# Patient Record
Sex: MALE | Race: WHITE
[De-identification: names, ages, dates, MRNs, and addresses within clinical notes are randomized per-mention and may not be internally consistent; named-entity substitution may affect disease eponyms.]

---

## 2017-07-15 ENCOUNTER — HOSPITAL ENCOUNTER (EMERGENCY)
Dept: HOSPITAL 47 - EC | Age: 37
Discharge: HOME | End: 2017-07-15
Payer: COMMERCIAL

## 2017-07-15 VITALS
DIASTOLIC BLOOD PRESSURE: 88 MMHG | TEMPERATURE: 98.2 F | HEART RATE: 85 BPM | SYSTOLIC BLOOD PRESSURE: 162 MMHG | RESPIRATION RATE: 18 BRPM
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
## 2017-07-15 DIAGNOSIS — S02.5XXA: Primary | ICD-10-CM

## 2017-07-15 DIAGNOSIS — F17.200: ICD-10-CM

## 2017-07-15 PROCEDURE — 99282 EMERGENCY DEPT VISIT SF MDM: CPT

## 2017-07-15 NOTE — ED
ENT HPI





- General


Chief complaint: Dental/Oral


Stated complaint: Dental Pain


Time Seen by Provider: 07/15/17 02:41


Source: patient


Mode of arrival: ambulatory


Limitations: no limitations





- Related Data


 Previous Rx's











 Medication  Instructions  Recorded


 


Acetaminophen-Codeine 300-30mg 1 tab PO Q6H PRN #15 tablet 07/15/17





[Tylenol #3]  


 


Penicillin V Potassium [Pen Vee K] 500 mg PO QID #40 tab 07/15/17











 Allergies











Allergy/AdvReac Type Severity Reaction Status Date / Time


 


No Known Allergies Allergy   Verified 07/15/17 02:38














Review of Systems


ROS Statement: 


Those systems with pertinent positive or pertinent negative responses have been 

documented in the HPI.





ROS Other: All systems not noted in ROS Statement are negative.





Past Medical History


Past Medical History: No Reported History


History of Any Multi-Drug Resistant Organisms: None Reported


Past Surgical History: Orthopedic Surgery


Past Psychological History: No Psychological Hx Reported


Smoking Status: Current every day smoker


Past Alcohol Use History: None Reported


Past Drug Use History: None Reported





General Exam


Limitations: no limitations


General appearance: alert, in no apparent distress


Head exam: Present: atraumatic, normocephalic, normal inspection


Eye exam: Present: normal appearance, PERRL, EOMI.  Absent: scleral icterus, 

conjunctival injection, periorbital swelling


ENT exam: Present: normal exam, mucous membranes moist, other.  Absent: normal 

oropharynx (Tooth number 30 is broken), TM's normal bilaterally


Neck exam: Present: normal inspection.  Absent: tenderness, meningismus, 

lymphadenopathy


Respiratory exam: Present: normal lung sounds bilaterally.  Absent: respiratory 

distress, wheezes, rales, rhonchi, stridor


Cardiovascular Exam: Present: regular rate, normal rhythm, normal heart sounds.

  Absent: systolic murmur, diastolic murmur, rubs, gallop, clicks


GI/Abdominal exam: Present: soft, normal bowel sounds.  Absent: distended, 

tenderness, guarding, rebound, rigid


Extremities exam: Present: normal inspection, full ROM, normal capillary 

refill.  Absent: tenderness, pedal edema, joint swelling, calf tenderness


Back exam: Present: normal inspection


Neurological exam: Present: alert, oriented X3, CN II-XII intact


Psychiatric exam: Present: normal affect, normal mood


Skin exam: Present: warm, dry, intact, normal color.  Absent: rash





Course


 Vital Signs











  07/15/17





  02:38


 


Temperature 98.2 F


 


Pulse Rate 85


 


Respiratory 18





Rate 


 


Blood Pressure 162/88


 


O2 Sat by Pulse 98





Oximetry 














Disposition


Clinical Impression: 


 Pain, dental, Broken tooth





Disposition: HOME SELF-CARE


Condition: Good


Instructions:  Toothache (ED)


Additional Instructions: 





[facility]





[address], [place] [address]





[phone].  [phone]  [phone] (existing clients only)





For new clients: [phone]





1st consult: $50 (includes Xrays)





Usually 30% less then private dentist for visits after. 





[facility]





Have to pay $50 for Xrays anmd rest is covered.





[phone]


Prescriptions: 


Acetaminophen-Codeine 300-30mg [Tylenol #3] 1 tab PO Q6H PRN #15 tablet


 PRN Reason: Pain


Penicillin V Potassium [Pen Vee K] 500 mg PO QID #40 tab


Referrals: 


Marlon Romo MD [Primary Care Provider] - 1-2 days


Time of Disposition: 02:51

## 2018-07-19 ENCOUNTER — HOSPITAL ENCOUNTER (OUTPATIENT)
Dept: HOSPITAL 47 - CATHEP | Age: 38
LOS: 1 days | Discharge: HOME | End: 2018-07-20
Payer: COMMERCIAL

## 2018-07-19 VITALS — BODY MASS INDEX: 40.9 KG/M2

## 2018-07-19 DIAGNOSIS — F17.210: ICD-10-CM

## 2018-07-19 DIAGNOSIS — J44.9: ICD-10-CM

## 2018-07-19 DIAGNOSIS — Z82.49: ICD-10-CM

## 2018-07-19 DIAGNOSIS — I10: ICD-10-CM

## 2018-07-19 DIAGNOSIS — I48.0: ICD-10-CM

## 2018-07-19 DIAGNOSIS — Z79.51: ICD-10-CM

## 2018-07-19 DIAGNOSIS — I47.1: Primary | ICD-10-CM

## 2018-07-19 DIAGNOSIS — G47.33: ICD-10-CM

## 2018-07-19 DIAGNOSIS — Z79.899: ICD-10-CM

## 2018-07-19 DIAGNOSIS — Z79.82: ICD-10-CM

## 2018-07-19 LAB
ANION GAP SERPL CALC-SCNC: 8 MMOL/L
BASOPHILS # BLD AUTO: 0 K/UL (ref 0–0.2)
BASOPHILS NFR BLD AUTO: 1 %
BUN SERPL-SCNC: 14 MG/DL (ref 9–20)
CALCIUM SPEC-MCNC: 9.4 MG/DL (ref 8.4–10.2)
CHLORIDE SERPL-SCNC: 108 MMOL/L (ref 98–107)
CO2 SERPL-SCNC: 23 MMOL/L (ref 22–30)
EOSINOPHIL # BLD AUTO: 0 K/UL (ref 0–0.7)
EOSINOPHIL NFR BLD AUTO: 0 %
ERYTHROCYTE [DISTWIDTH] IN BLOOD BY AUTOMATED COUNT: 4.86 M/UL (ref 4.3–5.9)
ERYTHROCYTE [DISTWIDTH] IN BLOOD: 14 % (ref 11.5–15.5)
GLUCOSE SERPL-MCNC: 99 MG/DL (ref 74–99)
HCT VFR BLD AUTO: 44.4 % (ref 39–53)
HGB BLD-MCNC: 15.5 GM/DL (ref 13–17.5)
LYMPHOCYTES # SPEC AUTO: 2 K/UL (ref 1–4.8)
LYMPHOCYTES NFR SPEC AUTO: 24 %
MCH RBC QN AUTO: 31.8 PG (ref 25–35)
MCHC RBC AUTO-ENTMCNC: 34.8 G/DL (ref 31–37)
MCV RBC AUTO: 91.3 FL (ref 80–100)
MONOCYTES # BLD AUTO: 0.5 K/UL (ref 0–1)
MONOCYTES NFR BLD AUTO: 6 %
NEUTROPHILS # BLD AUTO: 5.6 K/UL (ref 1.3–7.7)
NEUTROPHILS NFR BLD AUTO: 67 %
PLATELET # BLD AUTO: 196 K/UL (ref 150–450)
POTASSIUM SERPL-SCNC: 4.4 MMOL/L (ref 3.5–5.1)
SODIUM SERPL-SCNC: 139 MMOL/L (ref 137–145)
WBC # BLD AUTO: 8.3 K/UL (ref 3.8–10.6)

## 2018-07-19 PROCEDURE — 93653 COMPRE EP EVAL TX SVT: CPT

## 2018-07-19 PROCEDURE — 93623 PRGRMD STIMJ&PACG IV RX NFS: CPT

## 2018-07-19 PROCEDURE — 93662 INTRACARDIAC ECG (ICE): CPT

## 2018-07-19 PROCEDURE — 80048 BASIC METABOLIC PNL TOTAL CA: CPT

## 2018-07-19 PROCEDURE — 93613 INTRACARDIAC EPHYS 3D MAPG: CPT

## 2018-07-19 PROCEDURE — 85025 COMPLETE CBC W/AUTO DIFF WBC: CPT

## 2018-07-19 PROCEDURE — 85347 COAGULATION TIME ACTIVATED: CPT

## 2018-07-19 NOTE — LTR
Dear Dr. Romo:



Roland Grayson was underwent diagnostic EP study for recurrent palpitations.  He had

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

successfully ablated.  This was a retrogradely only conducting pathway and the

tachycardia was rendered noninducible.  He will take aspirin 325 mg p.o. daily for the

1st month and then can discontinue thereafter.  Metoprolol has been discontinued.



Thank you for entrusting us with the care of your patient.  Warm regards.



Sincerely,





MMASAFL / IJN: [id number] / Job#: [id number]

## 2018-07-19 NOTE — CE
CARDIAC ELECTROPHYSIOLOGY REPORT



This is a 37-year-old male patient who has had recurrent palpitations since the age of

12 years, who was referred by Dr. Svetlana Null who after he was admitted to [facility] with supraventricular tachycardia.



Patient was brought to the EP lab in a fasting state. Written informed consent was

obtained prior to the procedure.  The initial diagnostic part of the study was

performed under conscious sedation.  The patient has sleep apnea and could not lay

still.  The rest of the procedure, including mapping and ablation, was performed under

general anesthesia.



Venous sheaths were placed in the right and left femoral veins.  Two venous sheaths in

the left venous sheaths and the right femoral veins.  Diagnostic catheters placed high

right atrial catheter, His bundle catheter, RV catheter, coronary sinus catheter, and

later mapping ablation catheter and intracardiac echo catheter.



Sinus cycle length was 614 milliseconds.  CO interval 131 milliseconds, QRS 93

milliseconds,  millisecond, AH and HV intervals were within normal limits.  Sinus

node recovery times of 600 and 504 milliseconds and 785 and 753 milliseconds.  SVT was

induced with minimal pacing, mostly with double extrastimuli from the HRA as well as

from the coronary sinus.  Tachycardia cycle length 300 milliseconds.  Eccentric

conduction in the coronary sinus noted.  Pacing maneuvers would repeatedly terminate

the tachycardia.  The post pacing interval was short and the post pacing interval minus

tachycardia cycle length was less than 100 milliseconds, consistent with orthodromic re-

entry.



There was no evidence of slow pathway conduction and no antegrade accessory pathway

conduction noted.  This was purely retrograde conduction conducting accessory pathway.

This pathway was anterolateral and the coronary sinus catheter was moved proximally and

then more distally to bracket the area and with pacing on either side of the

approximate area of the retrograde accessory pathway, it was determined that this was

an oblique pathway with a medial to lateral orientation in the very distal poles close

to around CS3-4.



Intracardiac echo cardiography was performed.  3D anatomic mapping was performed.  Left

and right transseptal catheterization was performed.  RA pressure 17 x 10 x 14 and LA

pressure 20 x 10 x 15 mmHg.  Map and  tip ablation catheter was placed in the

left atrium.  The mitral anulus was carefully mapped, mostly during orthodromic

reentry.



This was also mapped with LV pacing from the lateral LV by placing the coronary sinus

catheter more ventricularward.  With both techniques, the earliest activation site was

fairly similar, but was closer to CS5-6; however, the bracketing was more close to CS2-

3.



Detailed mapping was performed.  Initially, we targeted area of earliest activation

site during orthodromic reentry, but the contact force was not adequate and therefore

even though termination occurred on multiple occasions, we were not able to completely

eliminate the pathway.



Finally with the CS catheter in the normal location with CS9-10 exactly at the os and

the distal pole in the more lateral position, RF ablation was applied and just shy of

the distal pole around the CS2-3 and a shift in the retrograde activation was noted.

RF ablation was applied here with good contact force and power and the pathway was

eliminated.  Following that, Isuprel was used and pacing maneuvers were performed in

the CS and the RV and no arrhythmias could be induced.



During the procedure, adenosine was also given and this induced brief atrial

fibrillation of less than 15 seconds with spontaneous termination.



The patient tolerated the procedure well without any acute complications.



RESULT:

Diagnostic EP study revealing orthodromic reentry, successful mapping and ablation of

the anterolateral retrogradely conducting accessory pathway without any antegrade

conduction.



PLAN:

Aspirin 325 mg p.o. daily for 1 month. Heparin was reversed at the end of the

procedure.





MMODL / IJN: [id number] / Job#: [id number]

## 2018-07-20 VITALS — SYSTOLIC BLOOD PRESSURE: 124 MMHG | HEART RATE: 98 BPM | DIASTOLIC BLOOD PRESSURE: 84 MMHG | TEMPERATURE: 98.4 F

## 2018-07-20 VITALS — RESPIRATION RATE: 18 BRPM

## 2018-07-20 RX ADMIN — APIXABAN SCH MG: 5 TABLET, FILM COATED ORAL at 08:43

## 2018-07-20 RX ADMIN — SODIUM CHLORIDE, PRESERVATIVE FREE SCH ML: 5 INJECTION INTRAVENOUS at 08:43

## 2018-07-20 RX ADMIN — ASPIRIN 325 MG ORAL TABLET SCH MG: 325 PILL ORAL at 08:43

## 2018-07-20 RX ADMIN — ASPIRIN 325 MG ORAL TABLET SCH: 325 PILL ORAL at 07:51

## 2018-07-20 RX ADMIN — SODIUM CHLORIDE, PRESERVATIVE FREE SCH: 5 INJECTION INTRAVENOUS at 07:52

## 2018-07-20 RX ADMIN — APIXABAN SCH: 5 TABLET, FILM COATED ORAL at 07:53

## 2018-07-20 NOTE — PN
PROGRESS NOTE



DATE OF SERVICE:

07/20/2018



CHIEF COMPLAINT:

Atrial fibrillation, status post ablation.



HISTORY OF PRESENT ILLNESS:

This gentleman is doing well and he is back in sinus rhythm.  He is going home today.

He has had no chest pain, shortness of breath, palpitations, etc.



PHYSICAL EXAM:

Color is good.  Vital signs are normal.  Chest is clear.  Cardiac is normal in sinus

rhythm.



IMPRESSION:

Status post ablation.



PLAN:

Home today.





MMODL / IJN: [id number] / Job#: [id number]

## 2018-07-20 NOTE — P.DS
Providers


Attending physician: 


Kiran Urrutia





Primary care physician: 


Marlon BABB [facility] Course: 





Impression is doing well.  He is walking around in the hallways.  No groin 

problems.  No hematoma or swelling





Vitals are stable.  He's afebrile 98.4F pulse rate 90 beats a minute, 

respirations normal blood pressure 124/84 mmHg


Heart sounds S1 and S2 are normal no murmurs or gallops no rub


Breath sounds are clear no rhonchi no crackles


Abdomen is soft nontender


Extremities warm no edema





Impression


Atrial ventricular reentrant tachycardia, left lateral accessory pathway status 

post successful ablation





Suggest


Full dose aspirin 325 mg by mouth daily for one month then stop


Stop metoprolol


Follow-up with Dr. Ceja in 2-3 weeks





Will be discharged home today





Plan - Discharge Summary


Discharge Rx Participant: Yes


New Discharge Prescriptions: 


New


   Aspirin EC [Ecotrin] 325 mg PO DAILY #90 tablet.





Discontinued


   Metoprolol Tartrate [Lopressor] 50 mg PO BID


   Aspirin [Adult Low Dose Aspirin EC] 81 mg PO DAILY


Discharge Medication List





Aspirin EC [Ecotrin] 325 mg PO DAILY #90 tablet. 07/19/18 [Rx]








Follow up Appointment(s)/Referral(s): 


Kiran Urrutia MD [STAFF PHYSICIAN] - 2 Weeks


Activity/Diet/Wound Care/Special Instructions: 


Post EP study - Ablation instructions





1.  Keep access sites dry for 2 days.


2.  No heavy lifting or straining for 2 days.


3.  Avoid bending the hips repeatedly for 2 days.


4.  You may go up and down stairs slowly  





Call if the following is noted





1.  Bleeding, increasing swelling or pain at the access sites.


2.  Increasing chest discomfort, especially upon taking a deep breath.


3.  Increasing shortness of breath, at rest or with exertion.


4.  Undue cough / phlegm


5.  Difficulty or pain while swallowing.


6.  Pain or change in color in the extremities.


7.  Fever, chills, rigors.


8.  Increasing headache or neurologic symptoms.


9.  Dizziness, fainting, palpitations





Full dose aspirin 325 mg by mouth daily for one month


No ELIQUIS at home


Stop metoprolol


Discharge Disposition: HOME SELF-CARE

## 2018-07-20 NOTE — CONS
CONSULTATION



CHIEF COMPLAINT:

Palpitations and rapid heart beating.



HISTORY OF PRESENT ILLNESS:

This gentleman is brought in for elective cardioversion.



REVIEW OF SYSTEMS:

He has had no headaches, CVAs, TIAs, chest pain, etc.  Past medical history, family

history, personal and social histories can be found and in his admitting summary.



Not allergic to any medication.



He has been on:

1. Metoprolol 25 mg twice a day and.

2. Symbicort 80, 2 puffs twice a day.

He does continue to smoke.



PHYSICAL EXAM:

Blood pressure is 138/98 with a pulse of 84, respirations of 18.  He is afebrile.

GENERAL:  He appeared to be overweight, in no acute distress.  Skin color is normal.

Skin is warm, dry. Lymph nodes are not enlarged.  Head, ears, eyes, nose, mouth, and

throat were normal.  Neck veins are not distended.  Thyroid is not enlarged.  Chest is

clear.  Cardiac demonstrates atrial fibrillation.  The abdomen is soft, nontender,

appeared slightly protuberant.

EXTREMITIES:  Normal.  Neurologically, he is intact.



IMPRESSION:

1. Atrial fibrillation.

2. Chronic obstructive pulmonary disease.

3. Paroxysmal atrial fibrillation.



RECOMMENDATIONS:

None.





MMODL / IJN: [id number] / Job#: [id number]